# Patient Record
Sex: FEMALE | Race: WHITE | NOT HISPANIC OR LATINO | URBAN - METROPOLITAN AREA
[De-identification: names, ages, dates, MRNs, and addresses within clinical notes are randomized per-mention and may not be internally consistent; named-entity substitution may affect disease eponyms.]

---

## 2022-11-02 ENCOUNTER — EMERGENCY (EMERGENCY)
Facility: HOSPITAL | Age: 82
LOS: 1 days | Discharge: HOME | End: 2022-11-02
Attending: EMERGENCY MEDICINE
Payer: MEDICARE

## 2022-11-02 VITALS
TEMPERATURE: 98 F | HEIGHT: 62.6 IN | OXYGEN SATURATION: 99 % | DIASTOLIC BLOOD PRESSURE: 78 MMHG | HEART RATE: 90 BPM | RESPIRATION RATE: 19 BRPM | WEIGHT: 136.69 LBS | SYSTOLIC BLOOD PRESSURE: 160 MMHG

## 2022-11-02 PROCEDURE — 99285 EMERGENCY DEPT VISIT HI MDM: CPT

## 2022-11-02 RX ORDER — MORPHINE SULFATE 50 MG/1
4 CAPSULE, EXTENDED RELEASE ORAL ONCE
Refills: 0 | Status: DISCONTINUED | OUTPATIENT
Start: 2022-11-02 | End: 2022-11-02

## 2022-11-03 VITALS
HEART RATE: 87 BPM | RESPIRATION RATE: 18 BRPM | OXYGEN SATURATION: 97 % | SYSTOLIC BLOOD PRESSURE: 116 MMHG | TEMPERATURE: 98 F | DIASTOLIC BLOOD PRESSURE: 69 MMHG

## 2022-11-03 LAB
ALBUMIN SERPL ELPH-MCNC: 3 G/DL — LOW (ref 3.5–5)
ALP SERPL-CCNC: 52 U/L — SIGNIFICANT CHANGE UP (ref 40–120)
ALT FLD-CCNC: 19 U/L DA — SIGNIFICANT CHANGE UP (ref 10–60)
ANION GAP SERPL CALC-SCNC: 9 MMOL/L — SIGNIFICANT CHANGE UP (ref 5–17)
APPEARANCE UR: CLEAR — SIGNIFICANT CHANGE UP
AST SERPL-CCNC: 13 U/L — SIGNIFICANT CHANGE UP (ref 10–40)
BASOPHILS # BLD AUTO: 0.06 K/UL — SIGNIFICANT CHANGE UP (ref 0–0.2)
BASOPHILS NFR BLD AUTO: 0.8 % — SIGNIFICANT CHANGE UP (ref 0–2)
BILIRUB SERPL-MCNC: 0.5 MG/DL — SIGNIFICANT CHANGE UP (ref 0.2–1.2)
BILIRUB UR-MCNC: NEGATIVE — SIGNIFICANT CHANGE UP
BUN SERPL-MCNC: 28 MG/DL — HIGH (ref 7–18)
CALCIUM SERPL-MCNC: 8.9 MG/DL — SIGNIFICANT CHANGE UP (ref 8.4–10.5)
CHLORIDE SERPL-SCNC: 108 MMOL/L — SIGNIFICANT CHANGE UP (ref 96–108)
CO2 SERPL-SCNC: 22 MMOL/L — SIGNIFICANT CHANGE UP (ref 22–31)
COLOR SPEC: YELLOW — SIGNIFICANT CHANGE UP
CREAT SERPL-MCNC: 1.53 MG/DL — HIGH (ref 0.5–1.3)
DIFF PNL FLD: ABNORMAL
EGFR: 34 ML/MIN/1.73M2 — LOW
EOSINOPHIL # BLD AUTO: 0.11 K/UL — SIGNIFICANT CHANGE UP (ref 0–0.5)
EOSINOPHIL NFR BLD AUTO: 1.5 % — SIGNIFICANT CHANGE UP (ref 0–6)
GLUCOSE SERPL-MCNC: 123 MG/DL — HIGH (ref 70–99)
GLUCOSE UR QL: NEGATIVE — SIGNIFICANT CHANGE UP
HCT VFR BLD CALC: 31.5 % — LOW (ref 34.5–45)
HGB BLD-MCNC: 9.6 G/DL — LOW (ref 11.5–15.5)
IMM GRANULOCYTES NFR BLD AUTO: 0.3 % — SIGNIFICANT CHANGE UP (ref 0–0.9)
KETONES UR-MCNC: NEGATIVE — SIGNIFICANT CHANGE UP
LEUKOCYTE ESTERASE UR-ACNC: NEGATIVE — SIGNIFICANT CHANGE UP
LIDOCAIN IGE QN: 88 U/L — SIGNIFICANT CHANGE UP (ref 73–393)
LYMPHOCYTES # BLD AUTO: 1.43 K/UL — SIGNIFICANT CHANGE UP (ref 1–3.3)
LYMPHOCYTES # BLD AUTO: 18.9 % — SIGNIFICANT CHANGE UP (ref 13–44)
MCHC RBC-ENTMCNC: 27.8 PG — SIGNIFICANT CHANGE UP (ref 27–34)
MCHC RBC-ENTMCNC: 30.5 GM/DL — LOW (ref 32–36)
MCV RBC AUTO: 91.3 FL — SIGNIFICANT CHANGE UP (ref 80–100)
MONOCYTES # BLD AUTO: 0.89 K/UL — SIGNIFICANT CHANGE UP (ref 0–0.9)
MONOCYTES NFR BLD AUTO: 11.8 % — SIGNIFICANT CHANGE UP (ref 2–14)
NEUTROPHILS # BLD AUTO: 5.05 K/UL — SIGNIFICANT CHANGE UP (ref 1.8–7.4)
NEUTROPHILS NFR BLD AUTO: 66.7 % — SIGNIFICANT CHANGE UP (ref 43–77)
NITRITE UR-MCNC: NEGATIVE — SIGNIFICANT CHANGE UP
NRBC # BLD: 0 /100 WBCS — SIGNIFICANT CHANGE UP (ref 0–0)
PH UR: 5 — SIGNIFICANT CHANGE UP (ref 5–8)
PLATELET # BLD AUTO: 323 K/UL — SIGNIFICANT CHANGE UP (ref 150–400)
POTASSIUM SERPL-MCNC: 4 MMOL/L — SIGNIFICANT CHANGE UP (ref 3.5–5.3)
POTASSIUM SERPL-SCNC: 4 MMOL/L — SIGNIFICANT CHANGE UP (ref 3.5–5.3)
PROT SERPL-MCNC: 7.4 G/DL — SIGNIFICANT CHANGE UP (ref 6–8.3)
PROT UR-MCNC: 15
RBC # BLD: 3.45 M/UL — LOW (ref 3.8–5.2)
RBC # FLD: 14 % — SIGNIFICANT CHANGE UP (ref 10.3–14.5)
SODIUM SERPL-SCNC: 139 MMOL/L — SIGNIFICANT CHANGE UP (ref 135–145)
SP GR SPEC: 1 — LOW (ref 1.01–1.02)
UROBILINOGEN FLD QL: NEGATIVE — SIGNIFICANT CHANGE UP
WBC # BLD: 7.56 K/UL — SIGNIFICANT CHANGE UP (ref 3.8–10.5)
WBC # FLD AUTO: 7.56 K/UL — SIGNIFICANT CHANGE UP (ref 3.8–10.5)

## 2022-11-03 PROCEDURE — 74176 CT ABD & PELVIS W/O CONTRAST: CPT | Mod: MA

## 2022-11-03 PROCEDURE — 36415 COLL VENOUS BLD VENIPUNCTURE: CPT

## 2022-11-03 PROCEDURE — 74176 CT ABD & PELVIS W/O CONTRAST: CPT | Mod: 26,MA

## 2022-11-03 PROCEDURE — 80053 COMPREHEN METABOLIC PANEL: CPT

## 2022-11-03 PROCEDURE — 83690 ASSAY OF LIPASE: CPT

## 2022-11-03 PROCEDURE — 85025 COMPLETE CBC W/AUTO DIFF WBC: CPT

## 2022-11-03 PROCEDURE — 96374 THER/PROPH/DIAG INJ IV PUSH: CPT

## 2022-11-03 PROCEDURE — 87086 URINE CULTURE/COLONY COUNT: CPT

## 2022-11-03 PROCEDURE — 99284 EMERGENCY DEPT VISIT MOD MDM: CPT | Mod: 25

## 2022-11-03 PROCEDURE — 81001 URINALYSIS AUTO W/SCOPE: CPT

## 2022-11-03 RX ORDER — OXYCODONE AND ACETAMINOPHEN 5; 325 MG/1; MG/1
1 TABLET ORAL
Qty: 12 | Refills: 0
Start: 2022-11-03 | End: 2022-11-05

## 2022-11-03 RX ORDER — TAMSULOSIN HYDROCHLORIDE 0.4 MG/1
1 CAPSULE ORAL
Qty: 7 | Refills: 0
Start: 2022-11-03 | End: 2022-11-09

## 2022-11-03 RX ORDER — IBUPROFEN 200 MG
1 TABLET ORAL
Qty: 28 | Refills: 0
Start: 2022-11-03 | End: 2022-11-09

## 2022-11-03 RX ADMIN — MORPHINE SULFATE 4 MILLIGRAM(S): 50 CAPSULE, EXTENDED RELEASE ORAL at 00:07

## 2022-11-03 NOTE — ED PROVIDER NOTE - PATIENT PORTAL LINK FT
You can access the FollowMyHealth Patient Portal offered by [facility] by registering at the following website: [email or web address]. By joining [employer]’s FollowMyHealth portal, you will also be able to view your health information using other applications (apps) compatible with our system.

## 2022-11-03 NOTE — ED PROVIDER NOTE - NSFOLLOWUPINSTRUCTIONS_ED_ALL_ED_FT
[facility]                                                                                                                    Kidney Stones       Kidney stones are solid, rock-like deposits that form inside of the kidneys. The kidneys are a pair of organs that make urine. A kidney stone may form in a kidney and move into other parts of the urinary tract, including the tubes that connect the kidneys to the bladder (ureters), the bladder, and the tube that carries urine out of the body (urethra). As the stone moves through these areas, it can cause intense pain and block the flow of urine.    Kidney stones are created when high levels of certain minerals are found in the urine. The stones are usually passed out of the body through urination, but in some cases, medical treatment may be needed to remove them.      What are the causes?    Kidney stones may be caused by:  •A condition in which certain glands produce too much parathyroid hormone (primary hyperparathyroidism), which causes too much calcium buildup in the blood.      •A buildup of uric acid crystals in the bladder (hyperuricosuria). Uric acid is a chemical that the body produces when you eat certain foods. It usually exits the body in the urine.      •Narrowing (stricture) of one or both of the ureters.      •A kidney blockage that is present at birth (congenital obstruction).      •Past surgery on the kidney or the ureters, such as gastric bypass surgery.        What increases the risk?    The following factors may make you more likely to develop this condition:  •Having had a kidney stone in the past.      •Having a family history of kidney stones.      •Not drinking enough water.      •Eating a diet that is high in protein, salt (sodium), or sugar.      •Being overweight or obese.        What are the signs or symptoms?    Symptoms of a kidney stone may include:  •Pain in the side of the abdomen, right below the ribs (flank pain). Pain usually spreads (radiates) to the groin.      •Needing to urinate frequently or urgently.      •Painful urination.      •Blood in the urine (hematuria).      •Nausea.      •Vomiting.      •Fever and chills.        How is this diagnosed?    This condition may be diagnosed based on:  •Your symptoms and medical history.      •A physical exam.      •Blood tests.      •Urine tests. These may be done before and after the stone passes out of your body through urination.      •Imaging tests, such as a CT scan, abdominal X-ray, or ultrasound.      •A procedure to examine the inside of the bladder (cystoscopy).        How is this treated?    Treatment for kidney stones depends on the size, location, and makeup of the stones. Kidney stones will often pass out of the body through urination. You may need to:  •Increase your fluid intake to help pass the stone. In some cases, you may be given fluids through an IV and may need to be monitored at the hospital.      •Take medicine for pain.      •Make changes in your diet to help prevent kidney stones from coming back.      Sometimes, medical procedures are needed to remove a kidney stone. This may involve:•A procedure to break up kidney stones using:  •A focused beam of light (laser therapy).      •Shock waves (extracorporeal shock wave lithotripsy).        •Surgery to remove kidney stones. This may be needed if you have severe pain or have stones that block your urinary tract.        Follow these instructions at home:    Medicines     •Take over-the-counter and prescription medicines only as told by your health care provider.      •Ask your health care provider if the medicine prescribed to you requires you to avoid driving or using heavy machinery.      Eating and drinking     •Drink enough fluid to keep your urine pale yellow. You may be instructed to drink at least 8–10 glasses of water each day. This will help you pass the kidney stone.    •If directed, change your diet. This may include:  •Limiting how much sodium you eat.      •Eating more fruits and vegetables.      •Limiting how much animal protein—such as red meat, poultry, fish, and eggs—you eat.        •Follow instructions from your health care provider about eating or drinking restrictions.      General instructions   •Collect urine samples as told by your health care provider. You may need to collect a urine sample:  •24 hours after you pass the stone.      •8–12 weeks after passing the kidney stone, and every 6–12 months after that.        •Strain your urine every time you urinate, for as long as directed. Use the strainer that your health care provider recommends.      • Do not throw out the kidney stone after passing it. Keep the stone so it can be tested by your health care provider. Testing the makeup of your kidney stone may help prevent you from getting kidney stones in the future.      •Keep all follow-up visits as told by your health care provider. This is important. You may need follow-up X-rays or ultrasounds to make sure that your stone has passed.        How is this prevented?     To prevent another kidney stone:  •Drink enough fluid to keep your urine pale yellow. This is the best way to prevent kidney stones.      •Eat a healthy diet and follow recommendations from your health care provider about foods to avoid. You may be instructed to eat a low-protein diet. Recommendations vary depending on the type of kidney stone that you have.      •Maintain a healthy weight.        Where to find more information    •National Kidney Foundation (NKF): www.kidney.org      •[employer] (UCF): www.urologyhealth.org        Contact a health care provider if:    •You have pain that gets worse or does not get better with medicine.        Get help right away if:    •You have a fever or chills.      •You develop severe pain.      •You develop new abdominal pain.      •You faint.      •You are unable to urinate.        Summary    •Kidney stones are solid, rock-like deposits that form inside of the kidneys.      •Kidney stones can cause nausea, vomiting, blood in the urine, abdominal pain, and the urge to urinate frequently.      •Treatment for kidney stones depends on the size, location, and makeup of the stones. Kidney stones will often pass out of the body through urination.      •Kidney stones can be prevented by drinking enough fluids, eating a healthy diet, and maintaining a healthy weight.      This information is not intended to replace advice given to you by your health care provider. Make sure you discuss any questions you have with your health care provider.      Document Revised: 05/01/2020 Document Reviewed: 05/05/2020    [employer] Patient Education © 2022 [employer]

## 2022-11-03 NOTE — ED PROVIDER NOTE - CARE PLAN
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 Nando Green is supervising clinician today.    ECO - No physically strenuous activity, but ambulatory and able to carry out light or sedentary work.    Review and verified Advanced Directives: Yes; verified forms in EMR    Verified if patient has state DNR bracelet on: No; Full Code    Nursing Assessment:   A focused nursing assessment addressing the toxicity of chemotherapy was performed and the patient reports the following:  Nausea: NO  Vomiting: NO  Fever: NO  Chills: NO  Other signs of infection: NO  Bleeding: NO  Mucositis: NO  Diarrhea: NO  Constipation: NO  Anorexia: NO  Dysuria: NO  Urinary Bleeding: NO  Cough: NO  Shortness of Breath: NO  Fatigue/Weakness: YES  Numbness/Tingling: NO  Other Neuropathies: NO  Edema: NO  Rash: NO  Hand/Foot Syndrome: NO  Anxiety/Depression/Insomnia: YES, insomnia  Pain: NO       Pre-Treatment: - Treatment consent signed  - Patient has valid pre-authorization  - VS completed  - Height and weight verified  BSA independently double checked & verified by two practitioners  - Premed orders, including hydration, are verified prior to administration  - Chemotherapy doses independently doubled checked & verified by two practitioners  - Chemotherapy infusion pump settings are double checked & verified by two practitioners  - Patient is identified by first & last name, Date of birth that has been verified by two practitioners with the patient chairside.    Treatment: Refer to LDA and MAR for line assessment and medication administration  Refer to Toxicity Assessment doc flowsheet   Chemotherapy has not ; double checked & verified by two practitioners  Appearance and physical integrity of drugs meets standard of drug monograph; double checked & verified by two practitioners  Rate set on infusion pump is in alignment with ordered rate; double checked & verified  by two practitioners  Blood return confirmed before, during and after treatment administered  Infusion pump used for non-vesicant drugs    Post Treatment: Treatment tolerated well; no adverse reaction    Does the Patient have a central line?  no    Transfusion: Not needed    Integrative Medicine: No    Oral Chemotherapy: No    Education: Instructed on chemotherapy medications, home medication of decadron, side effects of chemo.  Clarified with patient how to take PO dexamethasone at home.  Patient took 3 days prior to treatment, Dr. Green made aware, no new orders.  Patient was instructed to decrease ibuprofen usage while on alimta, Dr. Green aware of patient taking ibuprofen and stated  will continue to monitor kidney functions.  Next appointment scheduled:   Future Appointments   Date Time Provider Department [place]   3/17/2020  8:30 AM [id number]   3/17/2020  8:45 AM Nando Green MD [address]   4/1/2020  9:15 AM [id number] ACL LAB ACL[facility]   4/1/2020  9:30 AM Nando Grene MD [address]   8/28/2020  8:30 AM [facility]     Patient instructed to call the office with any questions or concerns.    Patient Discharged: patient discharged to home per self, ambulatory     Principal Discharge DX:	Kidney stone   1

## 2022-11-03 NOTE — ED ADULT NURSE NOTE - OBJECTIVE STATEMENT
RN  Covering notes: pt came to ED due to left lower abdominal pain x 2-3 days had slipped and fall  few days ago at home denies headinjury.

## 2022-11-03 NOTE — ED PROVIDER NOTE - PROGRESS NOTE DETAILS
pain improved. labs are unremarkable .ua with small blood. ct abdo/pelvis with large left uretal stone. will dc. pt already has a urologist. will dc. return precautions discussed.

## 2022-11-03 NOTE — ED PROVIDER NOTE - CLINICAL SUMMARY MEDICAL DECISION MAKING FREE TEXT BOX
82 year old female with left sided abd pain. PE as above.  labs, UA, pain control, ct abdo/pelvis, reassess

## 2022-11-03 NOTE — ED PROVIDER NOTE - OBJECTIVE STATEMENT
82 year old female PMH DM, HTN, anemia, kidney stones coming in with left sided abdominal pain for the past 2 days. did have a minor fall 2 days ago and unsure if that could have caused the pain. denies all other complaints.

## 2022-11-04 LAB
CULTURE RESULTS: SIGNIFICANT CHANGE UP
SPECIMEN SOURCE: SIGNIFICANT CHANGE UP

## 2023-01-11 ENCOUNTER — APPOINTMENT (OUTPATIENT)
Dept: UROLOGY | Facility: CLINIC | Age: 83
End: 2023-01-11
Payer: MEDICARE

## 2023-01-11 VITALS
SYSTOLIC BLOOD PRESSURE: 111 MMHG | RESPIRATION RATE: 17 BRPM | HEIGHT: 59 IN | HEART RATE: 80 BPM | TEMPERATURE: 97.6 F | BODY MASS INDEX: 26.21 KG/M2 | DIASTOLIC BLOOD PRESSURE: 66 MMHG | WEIGHT: 130 LBS

## 2023-01-11 DIAGNOSIS — N20.1 CALCULUS OF URETER: ICD-10-CM

## 2023-01-11 DIAGNOSIS — N20.0 CALCULUS OF KIDNEY: ICD-10-CM

## 2023-01-11 PROCEDURE — 99205 OFFICE O/P NEW HI 60 MIN: CPT

## 2023-01-11 NOTE — PHYSICAL EXAM
[General Appearance - Well Developed] : well developed [General Appearance - Well Nourished] : well nourished [Normal Appearance] : normal appearance [Well Groomed] : well groomed [General Appearance - In No Acute Distress] : no acute distress [] : no respiratory distress [Respiration, Rhythm And Depth] : normal respiratory rhythm and effort [Exaggerated Use Of Accessory Muscles For Inspiration] : no accessory muscle use [Abdomen Soft] : soft [Abdomen Tenderness] : non-tender [Costovertebral Angle Tenderness] : no ~M costovertebral angle tenderness [Normal Station and Gait] : the gait and station were normal for the patient's age [Skin Color & Pigmentation] : normal skin color and pigmentation [Oriented To Time, Place, And Person] : oriented to person, place, and time [Not Anxious] : not anxious

## 2023-01-11 NOTE — ASSESSMENT
[Ureteral Stone (592.1\N20.1)] : position [FreeTextEntry1] : Will need left PCNL left ureteroscopy laser lithotripsy\par At a later date, we will proceed to the right side if not possible to do both sides during the same hospitalization\par Potential complications of bleeding, transfusion, selective angioembolization if indicated, adjacent organ injury, fever, sepsis, infection, incomplete stone clearance, bowel or other unusual injury, chest complications, vascular injuries, procedures needed to address any complications, ureteral injury, anesthetic complications, all discussed.\par Counseled patient and son to expect the patient to need transfusion given her baseline anemia

## 2023-01-11 NOTE — HISTORY OF PRESENT ILLNESS
[FreeTextEntry1] : 82-year-old woman\par Accompanied by her son who acted as  throughout\par Patient presents for evaluation\par She was recently at [facility] with left-sided renal colic on November 2, 2022.  She was diagnosed with a large obstructing left mid to proximal ureteral stone.  Her pain was controlled and she was discharged home with instructions for outpatient consultation.  After period of about 5 days, her pain resolved.  She has remained pain-free since then.\par \par She is currently under treatment for anemia as well under the care of a hematologist.  She is currently receiving iron infusions intravenously\par \par Hospital records reviewed, chart reviewed, hematology notes reviewed, labs reviewed, CT images reviewed\par Large bilateral renal stones involving lower calyces renal pelvis and mid calyces, confirmed left mid to proximal ureteral stone, right-sided hydroureter possible right mid ureteral stones.\par \par Creatinine 0.87-1.53\par Urine cultures negative x2\par \par PMH/PSH: Type II DM, HTN, anemia, kidney stones, osteoarthritis, Hyperlipidemia, cataract surgery\par \par Meds: Iron infusion,  Arthrotec 75 75 mg­200 mcg tablet, Fred 5 mg­20 mg tablet, Janumet 50 mg­500 mg tablet, Vitamin D2 1250 mcg ([id number] unit) capsule\par \par Allergies: NKDA

## 2023-01-11 NOTE — HISTORY OF PRESENT ILLNESS
[FreeTextEntry1] : 82-year-old woman\par Accompanied by her son who acted as  throughout\par Patient presents for evaluation\par She was recently at [facility] with left-sided renal colic on November 2, 2022.  She was diagnosed with a large obstructing left mid to proximal ureteral stone.  Her pain was controlled and she was discharged home with instructions for outpatient consultation.  After period of about 5 days, her pain resolved.  She has remained pain-free since then.\par \par She is currently under treatment for anemia as well under the care of a hematologist.  She is currently receiving iron infusions intravenously\par \par Hospital records reviewed, chart reviewed, hematology notes reviewed, labs reviewed, CT images reviewed\par Large bilateral renal stones involving lower calyces renal pelvis and mid calyces, confirmed left mid to proximal ureteral stone, right-sided hydroureter possible right mid ureteral stones.\par \par Creatinine 0.87-1.53\par Urine cultures negative x2\par \par PMH/PSH: Type II DM, HTN, anemia, kidney stones, osteoarthritis, Hyperlipidemia, cataract surgery\par \par Meds: Iron infusion,  Arthrotec 75 75 mg­200 mcg tablet, Fred 5 mg­20 mg tablet, Janumet 50 mg­500 mg tablet, Vitamin D2 1250 mcg ([id number] unit) capsule\par \par Allergies: NKDA

## 2023-02-21 ENCOUNTER — APPOINTMENT (OUTPATIENT)
Dept: UROLOGY | Facility: HOSPITAL | Age: 83
End: 2023-02-21

## 2024-03-22 ENCOUNTER — NON-APPOINTMENT (OUTPATIENT)
Age: 84
End: 2024-03-22

## 2024-03-22 ENCOUNTER — APPOINTMENT (OUTPATIENT)
Dept: RADIATION ONCOLOGY | Facility: CLINIC | Age: 84
End: 2024-03-22
Payer: MEDICARE

## 2024-03-22 VITALS — WEIGHT: 138.89 LBS | BODY MASS INDEX: 24.61 KG/M2 | HEIGHT: 63 IN | RESPIRATION RATE: 16 BRPM

## 2024-03-22 DIAGNOSIS — I10 ESSENTIAL (PRIMARY) HYPERTENSION: ICD-10-CM

## 2024-03-22 DIAGNOSIS — E78.5 HYPERLIPIDEMIA, UNSPECIFIED: ICD-10-CM

## 2024-03-22 DIAGNOSIS — Z78.9 OTHER SPECIFIED HEALTH STATUS: ICD-10-CM

## 2024-03-22 DIAGNOSIS — E11.9 TYPE 2 DIABETES MELLITUS W/OUT COMPLICATIONS: ICD-10-CM

## 2024-03-22 PROCEDURE — 99204 OFFICE O/P NEW MOD 45 MIN: CPT

## 2024-03-22 NOTE — VITALS
[Maximal Pain Intensity: 3/10] : 3/10 [Least Pain Intensity: 0/10] : 0/10 [Pain Description/Quality: ___] : Pain description/quality: [unfilled] [Pain Location: ___] : Pain Location: [unfilled] [Pain Duration: ___] : Pain duration: [unfilled] [80: Normal activity with effort; some signs or symptoms of disease.] : 80: Normal activity with effort; some signs or symptoms of disease.  [OTC] : OTC [ECOG Performance Status: 1 - Restricted in physically strenuous activity but ambulatory and able to carry out work of a light or sedentary nature] : Performance Status: 1 - Restricted in physically strenuous activity but ambulatory and able to carry out work of a light or sedentary nature, e.g., light house work, office work [5 - Distress Level] : Distress Level: 5

## 2024-03-24 NOTE — REASON FOR VISIT
[Consideration of Curative Therapy] : consideration of curative therapy for [Other: ___] : [unfilled] [Family Member] : family member [Other: ______] : provided by [employer] [Interpreters_IDNumber] : [id number] [Interpreters_FullName] : Max [TWNoteComboBox1] : Bangladeshi

## 2024-03-24 NOTE — HISTORY OF PRESENT ILLNESS
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 It is ineligible for topical panretin

## 2024-03-24 NOTE — DISEASE MANAGEMENT
[Clinical] : TNM Stage: c [I] : I [TTNM] : 0 [FreeTextEntry4] : kaposi sarcoma [NTNM] : x [MTNM] : x

## 2024-03-24 NOTE — PHYSICAL EXAM
[Normal] : oriented to person, place and time, the affect was normal, the mood was normal and not anxious [de-identified] : 3cm peduculated lesion left sole

## 2024-03-24 NOTE — REVIEW OF SYSTEMS
[Negative] : Allergic/Immunologic [FreeTextEntry5] : HTN, HLD [de-identified] : DM2 [de-identified] : left sole skin lesion

## 2024-03-24 NOTE — END OF VISIT
[FreeTextEntry3] : Agree with above. Resection given tumor size followed by RT. Discussed with Dr Hope who will consider surgery.  [] : Resident [Time Spent: ___ minutes] : I have spent [unfilled] minutes of time on the encounter.

## 2024-03-26 ENCOUNTER — APPOINTMENT (OUTPATIENT)
Dept: SURGICAL ONCOLOGY | Facility: CLINIC | Age: 84
End: 2024-03-26
Payer: MEDICARE

## 2024-03-26 VITALS
HEART RATE: 77 BPM | OXYGEN SATURATION: 97 % | HEIGHT: 62 IN | BODY MASS INDEX: 25.4 KG/M2 | RESPIRATION RATE: 16 BRPM | DIASTOLIC BLOOD PRESSURE: 73 MMHG | SYSTOLIC BLOOD PRESSURE: 124 MMHG | WEIGHT: 138 LBS

## 2024-03-26 PROCEDURE — 99205 OFFICE O/P NEW HI 60 MIN: CPT

## 2024-03-29 ENCOUNTER — APPOINTMENT (OUTPATIENT)
Dept: RADIATION ONCOLOGY | Facility: CLINIC | Age: 84
End: 2024-03-29
Payer: MEDICARE

## 2024-03-29 PROCEDURE — 99442: CPT | Mod: 93

## 2024-03-29 NOTE — VITALS
[Maximal Pain Intensity: 3/10] : 3/10 [Pain Description/Quality: ___] : Pain description/quality: [unfilled] [Least Pain Intensity: 0/10] : 0/10 [Pain Duration: ___] : Pain duration: [unfilled] [OTC] : OTC [Pain Location: ___] : Pain Location: [unfilled] [80: Normal activity with effort; some signs or symptoms of disease.] : 80: Normal activity with effort; some signs or symptoms of disease.  [ECOG Performance Status: 1 - Restricted in physically strenuous activity but ambulatory and able to carry out work of a light or sedentary nature] : Performance Status: 1 - Restricted in physically strenuous activity but ambulatory and able to carry out work of a light or sedentary nature, e.g., light house work, office work [5 - Distress Level] : Distress Level: 5

## 2024-03-29 NOTE — REASON FOR VISIT
[Other: ___] : [unfilled] [Family Member] : family member [Medical Office: ([facility])___] : at the medical office located in  [Home] : at home, [unfilled] , at the time of the visit. [Formal Caregiver] : formal caregiver [Verbal consent obtained from patient] : the patient, [unfilled] [Routine Follow-Up] : routine follow-up visit for

## 2024-04-01 NOTE — HISTORY OF PRESENT ILLNESS
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 It is ineligible for topical panretin.  3/29/2024 F/U. Pt saw Surgeon Dr Hope, surgery for left foot Kaposi sarcoma was scheduled for 4/18/2024.

## 2024-04-01 NOTE — DISEASE MANAGEMENT
[Clinical] : TNM Stage: c [I] : I [FreeTextEntry4] : kaposi sarcoma [TTNM] : 0 [MTNM] : x [NTNM] : x

## 2024-04-01 NOTE — REVIEW OF SYSTEMS
[Negative] : Allergic/Immunologic [FreeTextEntry5] : HTN, HLD [de-identified] : left sole skin lesion [de-identified] : DM2

## 2024-04-04 ENCOUNTER — APPOINTMENT (OUTPATIENT)
Dept: ULTRASOUND IMAGING | Facility: CLINIC | Age: 84
End: 2024-04-04
Payer: MEDICARE

## 2024-04-04 ENCOUNTER — APPOINTMENT (OUTPATIENT)
Dept: RADIOLOGY | Facility: CLINIC | Age: 84
End: 2024-04-04
Payer: MEDICARE

## 2024-04-04 PROCEDURE — 71046 X-RAY EXAM CHEST 2 VIEWS: CPT

## 2024-04-04 PROCEDURE — 76882 US LMTD JT/FCL EVL NVASC XTR: CPT | Mod: LT

## 2024-04-12 ENCOUNTER — OUTPATIENT (OUTPATIENT)
Dept: OUTPATIENT SERVICES | Facility: HOSPITAL | Age: 84
LOS: 1 days | End: 2024-04-12
Payer: MEDICARE

## 2024-04-12 ENCOUNTER — OUTPATIENT (OUTPATIENT)
Dept: OUTPATIENT SERVICES | Facility: HOSPITAL | Age: 84
LOS: 1 days | End: 2024-04-12

## 2024-04-12 VITALS
WEIGHT: 134.92 LBS | HEIGHT: 61.5 IN | HEART RATE: 87 BPM | OXYGEN SATURATION: 98 % | RESPIRATION RATE: 16 BRPM | SYSTOLIC BLOOD PRESSURE: 125 MMHG | TEMPERATURE: 98 F | DIASTOLIC BLOOD PRESSURE: 71 MMHG

## 2024-04-12 DIAGNOSIS — I10 ESSENTIAL (PRIMARY) HYPERTENSION: ICD-10-CM

## 2024-04-12 DIAGNOSIS — Z98.49 CATARACT EXTRACTION STATUS, UNSPECIFIED EYE: Chronic | ICD-10-CM

## 2024-04-12 DIAGNOSIS — C46.9 KAPOSI'S SARCOMA, UNSPECIFIED: ICD-10-CM

## 2024-04-12 DIAGNOSIS — C80.1 MALIGNANT (PRIMARY) NEOPLASM, UNSPECIFIED: ICD-10-CM

## 2024-04-12 DIAGNOSIS — Z85.9 PERSONAL HISTORY OF MALIGNANT NEOPLASM, UNSPECIFIED: ICD-10-CM

## 2024-04-12 RX ORDER — SODIUM CHLORIDE 9 MG/ML
1000 INJECTION, SOLUTION INTRAVENOUS
Refills: 0 | Status: DISCONTINUED | OUTPATIENT
Start: 2024-04-18 | End: 2024-05-02

## 2024-04-12 NOTE — H&P PST ADULT - ATTENDING COMMENTS
83-year-old lady with Kaposi's sarcoma of the bottom of her left foot.    She is scheduled for resection, with plastics closure by Dr. Artemio Rdz.    Her diagnosis and plan were reviewed with her and her daughter in my office, and again today.    oncologic diagnosis, surgical approach, risks, benefits and possible surgical outcomes reviewed in detail, all questions answered.    Consent on chart

## 2024-04-12 NOTE — H&P PST ADULT - HISTORY OF PRESENT ILLNESS
Pt is a 83 yr old female scheduled for Wide Excision Left Foot Sarcoma with Dr Hope and Closure Left Foot Wound Poss Local Tissue Rearrangement Poss skin Graft with wound vac with Dr Rdz  4/18/24 - pt speaks [place] and is with grand daughter who translated - pt noted raised , scaled growth mid-sole left foot 1/24 - biopsy done - pt denies pain but states it is uncomfortable to walk - pt with hx HTN, arthritis , HLD

## 2024-04-12 NOTE — H&P PST ADULT - NSANTHOSAYNRD_GEN_A_CORE
No. TIANA screening performed.  STOP BANG Legend: 0-2 = LOW Risk; 3-4 = INTERMEDIATE Risk; 5-8 = HIGH Risk

## 2024-04-12 NOTE — H&P PST ADULT - PROBLEM SELECTOR PLAN 1
Pt scheduled for surgery and preop instructions including instructions for taking Famotidine and for Chlorhexidine use in showering on the day of surgery, given verbally and with use of  written materials, and patient confirming understanding of such instructions using  teach back method.  Request cardio evaluation from PCP office with EKG , Echo and stress test results

## 2024-04-15 ENCOUNTER — RESULT REVIEW (OUTPATIENT)
Age: 84
End: 2024-04-15

## 2024-04-15 PROCEDURE — 88321 CONSLTJ&REPRT SLD PREP ELSWR: CPT

## 2024-04-16 LAB — SURGICAL PATHOLOGY STUDY: SIGNIFICANT CHANGE UP

## 2024-04-17 NOTE — ASU PATIENT PROFILE, ADULT - NSICDXPASTMEDICALHX_GEN_ALL_CORE_FT
PAST MEDICAL HISTORY:  Arthritis     H/O Kaposi's sarcoma of skin     HLD (hyperlipidemia)     HTN (hypertension)     Kidney stone

## 2024-04-17 NOTE — ASU PATIENT PROFILE, ADULT - FALL HARM RISK - HARM RISK INTERVENTIONS
Assistance with ambulation/Assistance OOB with selected safe patient handling equipment/Communicate Risk of Fall with Harm to all staff/Discuss with provider need for PT consult/Monitor gait and stability/Reinforce activity limits and safety measures with patient and family/Tailored Fall Risk Interventions/Visual Cue: Yellow wristband and red socks/Bed in lowest position, wheels locked, appropriate side rails in place/Call bell, personal items and telephone in reach/Instruct patient to call for assistance before getting out of bed or chair/Non-slip footwear when patient is out of bed/[place] to call system/Physically safe environment - no spills, clutter or unnecessary equipment/Purposeful Proactive Rounding/Room/bathroom lighting operational, light cord in reach

## 2024-04-18 ENCOUNTER — APPOINTMENT (OUTPATIENT)
Dept: SURGICAL ONCOLOGY | Facility: HOSPITAL | Age: 84
End: 2024-04-18

## 2024-04-18 ENCOUNTER — APPOINTMENT (OUTPATIENT)
Dept: PLASTIC SURGERY | Facility: HOSPITAL | Age: 84
End: 2024-04-18
Payer: MEDICARE

## 2024-04-18 ENCOUNTER — TRANSCRIPTION ENCOUNTER (OUTPATIENT)
Age: 84
End: 2024-04-18

## 2024-04-18 ENCOUNTER — RESULT REVIEW (OUTPATIENT)
Age: 84
End: 2024-04-18

## 2024-04-18 ENCOUNTER — OUTPATIENT (OUTPATIENT)
Dept: OUTPATIENT SERVICES | Facility: HOSPITAL | Age: 84
LOS: 1 days | Discharge: HOME | End: 2024-04-18
Payer: MEDICARE

## 2024-04-18 VITALS
HEART RATE: 85 BPM | RESPIRATION RATE: 16 BRPM | DIASTOLIC BLOOD PRESSURE: 66 MMHG | SYSTOLIC BLOOD PRESSURE: 106 MMHG | OXYGEN SATURATION: 96 %

## 2024-04-18 VITALS
DIASTOLIC BLOOD PRESSURE: 53 MMHG | HEART RATE: 80 BPM | OXYGEN SATURATION: 99 % | WEIGHT: 134.92 LBS | TEMPERATURE: 99 F | SYSTOLIC BLOOD PRESSURE: 139 MMHG | RESPIRATION RATE: 16 BRPM | HEIGHT: 61.5 IN

## 2024-04-18 DIAGNOSIS — Z98.49 CATARACT EXTRACTION STATUS, UNSPECIFIED EYE: Chronic | ICD-10-CM

## 2024-04-18 DIAGNOSIS — C46.9 KAPOSI'S SARCOMA, UNSPECIFIED: ICD-10-CM

## 2024-04-18 PROCEDURE — 15240 FTH/GFT F/C/C/M/N/AX/G/H/F20: CPT

## 2024-04-18 PROCEDURE — 15004 WOUND PREP F/N/HF/G: CPT

## 2024-04-18 PROCEDURE — 88305 TISSUE EXAM BY PATHOLOGIST: CPT | Mod: 26

## 2024-04-18 PROCEDURE — 29515 APPLICATION SHORT LEG SPLINT: CPT | Mod: LT,59

## 2024-04-18 PROCEDURE — 11624 EXC S/N/H/F/G MAL+MRG 3.1-4: CPT

## 2024-04-18 RX ORDER — ROSUVASTATIN CALCIUM 5 MG/1
1 TABLET ORAL
Refills: 0 | DISCHARGE

## 2024-04-18 RX ORDER — DICLOFENAC SODIUM/MISOPROSTOL 50 MG-200
1 TABLET,IMMEDIATE,DELAY RELEASE,BIPHASE ORAL
Refills: 0 | DISCHARGE

## 2024-04-18 RX ORDER — ASPIRIN/CALCIUM CARB/MAGNESIUM 324 MG
0 TABLET ORAL
Refills: 0 | DISCHARGE

## 2024-04-18 RX ORDER — OLMESARTAN MEDOXOMIL 5 MG/1
1 TABLET, FILM COATED ORAL
Refills: 0 | DISCHARGE

## 2024-04-18 NOTE — ASU DISCHARGE PLAN (ADULT/PEDIATRIC) - PAIN MANAGEMENT
You were given 1000mg IV Tylenol for pain management.  Please DO NOT take any Tylenol containing products, such as  Vicodin, Percocet, Excedrin, many cold preparations for the next 6 hours (until  3PM). DO NOT EXCEED 3000MG OF TYLENOL OVER 24 HOURS.

## 2024-04-18 NOTE — ASU DISCHARGE PLAN (ADULT/PEDIATRIC) - FOLLOW UP APPOINTMENTS
[facility] may also call Recovery Room (PACU) 24/7 @ [phone] or 911/[facility]

## 2024-04-18 NOTE — ASU DISCHARGE PLAN (ADULT/PEDIATRIC) - ASU DC SPECIAL INSTRUCTIONSFT
Initial followup with plastic surgery in the next 5-15 days, regarding matters of bandages, activities, and showering.    Dr. Hope should call with pathology report in approximately 2 weeks.  The conversation will determine further management.

## 2024-04-19 PROBLEM — I10 ESSENTIAL (PRIMARY) HYPERTENSION: Chronic | Status: ACTIVE | Noted: 2024-04-12

## 2024-04-19 PROBLEM — N20.0 CALCULUS OF KIDNEY: Chronic | Status: ACTIVE | Noted: 2024-04-12

## 2024-04-19 PROBLEM — M19.90 UNSPECIFIED OSTEOARTHRITIS, UNSPECIFIED SITE: Chronic | Status: ACTIVE | Noted: 2024-04-12

## 2024-04-19 PROBLEM — Z85.828 PERSONAL HISTORY OF OTHER MALIGNANT NEOPLASM OF SKIN: Chronic | Status: ACTIVE | Noted: 2024-04-12

## 2024-04-30 PROBLEM — E78.5 HYPERLIPIDEMIA, UNSPECIFIED: Chronic | Status: ACTIVE | Noted: 2024-04-12

## 2024-05-01 ENCOUNTER — APPOINTMENT (OUTPATIENT)
Dept: PLASTIC SURGERY | Facility: CLINIC | Age: 84
End: 2024-05-01
Payer: MEDICARE

## 2024-05-01 VITALS
WEIGHT: 138 LBS | HEART RATE: 84 BPM | TEMPERATURE: 98.1 F | OXYGEN SATURATION: 98 % | BODY MASS INDEX: 25.4 KG/M2 | HEIGHT: 62 IN | SYSTOLIC BLOOD PRESSURE: 118 MMHG | DIASTOLIC BLOOD PRESSURE: 72 MMHG

## 2024-05-01 DIAGNOSIS — C46.9 KAPOSI'S SARCOMA, UNSPECIFIED: ICD-10-CM

## 2024-05-01 LAB — SURGICAL PATHOLOGY STUDY: SIGNIFICANT CHANGE UP

## 2024-05-01 PROCEDURE — 99024 POSTOP FOLLOW-UP VISIT: CPT

## 2024-05-01 NOTE — PHYSICAL EXAM
[de-identified] : Left foot bolster removed, skin graft in place with good adherence, wound clean, no drainage/bleeding noted, no sign of gross infection, dressed with bacitracin and xeroform [de-identified] : left lower abdominal incision clean

## 2024-05-01 NOTE — HISTORY OF PRESENT ILLNESS
[FreeTextEntry1] :  Pt is a 83 year female s/p complete closure left foot with skin graft. DOS 4/18/24. Patient accompanied by family member. Pt doing well. No further complaints at this time. Denies fever, chills, pain, rash.

## 2024-05-02 NOTE — REVIEW OF SYSTEMS
[Negative] : Musculoskeletal [FreeTextEntry5] : Hypertension [FreeTextEntry7] : NKDA [FreeTextEntry9] : Nephrolithiasis [de-identified] : Kapwade's [de-identified] : Diabetes [FreeTextEntry1] : Anemia

## 2024-05-02 NOTE — HISTORY OF PRESENT ILLNESS
[de-identified] : 83-year-old lady.  Referred by radiation oncology: Dr. Loretta BASILOI.  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  Not a candidate for topical treatment, or primary radiation therapy. Presents to discuss resection with the anticipation of adjuvant radiation therapy.  Biopsy was performed by dermatology: Dr. Rose HECTOR.   The patient has been aware of the growth since 2024. It is gradually grown, and has become tender, to even now painful at rest.  No preceding trauma or strain.  No other signs or symptoms.   Not immunocompromise.  No personal history of malignancy.   No relatives with a history of cancer.   PMD: Dr. Lucinda MORALES.  NKDA.  No pacemaker or defibrillator. No anticoagulants.  81 mg aspirin daily.  + DIABETES. She has stopped (on her own accord) her medications (she was prescribed Janumet).  + Hypertension. Treated with Benicar (Olmesartan).  + Hypercholesterolemia. Treated with rosuvastatin (Crestor).  + Anemia. Hematology: Dr. Fay PARKS. Cause attributed to chronic kidney disease. She receives periodic/intermittent iron infusions intravenously.  + History of nephrolithiasis. Also has chronic kidney disease. Neurology: Dr. Haseeb DOMINGUEZ.  + Osteoarthritis. Takes daily [employer].  History of cataract surgery (2022).   Menarche at 15.  5, para 4. Natural menopause at 49.  She has not seen a gynecologist since at least .  Last mammogram was ~.  She has never had a colonoscopy.

## 2024-05-02 NOTE — REASON FOR VISIT
[Initial Consultation] : an initial consultation for [Other: _____] : [unfilled] [FreeTextEntry2] : Nodular Kaposi's sarcoma of the bottom of the left foot, not amenable to topical treatment, or primary radiation therapy, referred for consideration of surgery.

## 2024-05-02 NOTE — ASSESSMENT
[FreeTextEntry1] : 83-year-old lady with a history of diabetes, anemia, hypertension, hypercholesterolemia, osteoarthritis, nephrolithiasis, and cataracts.  She has a nodular Kaposi's sarcoma on the bottom of her left foot.  She is not a candidate for topical treatment, or primary radiation therapy.  She has been referred for consideration of resection, which would be coordinated with plastic surgery for reconstruction.   Diagnosis reviewed.  For her preoperative assessment I recommended: Chest x-ray. Left groin ultrasound. Both prescriptions entered/provided.  I have asked him to call me a week after the imaging to discuss the results.   If there are no worrisome findings on her preoperative imaging, I have offered excision as an outpatient procedure, coordinated with plastic surgery for reconstruction, with the anticipation of adjuvant radiation therapy.  Reviewed in detail, all questions answered.  They understand and would like to proceed with surgery, paperwork submitted.  Referring physician contacted through secure email.   04/0/24: Left foot sonogram through our system: 1.  Left foot: Plantar. 1.3 x 0.7 x 1.1 cm hypoechoic nodule. 2.  Left groin: No adenopathy.  Accompanying chest x-ray was normal.   04/12/2024: I returned a call from her granddaughter (Tiffanie: [phone]). I reviewed the above favorable imaging results.  Surgery is tentatively scheduled for April 18.   5/3/2024: I called her daughter-in-law (Tiffanie: [phone]). I had to leave a voicemail. Pathology from the April 18, 2024, operation demonstrates a 1.5 cm nodular Kaposi's sarcoma. Margins of resection are negative. Plastics: Dr. Artemio Rdz. Referring physician contacted through secure email.

## 2024-05-02 NOTE — PHYSICAL EXAM
[Normal] : supple, no neck mass and thyroid not enlarged [Normal Neck Lymph Nodes] : normal neck lymph nodes  [Normal Supraclavicular Lymph Nodes] : normal supraclavicular lymph nodes [Normal Groin Lymph Nodes] : normal groin lymph nodes [Normal Axillary Lymph Nodes] : normal axillary lymph nodes [Normal] : full range of motion and no deformities appreciated [de-identified] : See diagram

## 2024-05-14 ENCOUNTER — APPOINTMENT (OUTPATIENT)
Dept: RADIATION ONCOLOGY | Facility: CLINIC | Age: 84
End: 2024-05-14

## 2024-05-15 ENCOUNTER — APPOINTMENT (OUTPATIENT)
Dept: PLASTIC SURGERY | Facility: CLINIC | Age: 84
End: 2024-05-15
Payer: MEDICARE

## 2024-05-15 VITALS — BODY MASS INDEX: 25.4 KG/M2 | TEMPERATURE: 98 F | WEIGHT: 138 LBS | HEIGHT: 62 IN

## 2024-05-15 PROCEDURE — 99024 POSTOP FOLLOW-UP VISIT: CPT

## 2024-05-18 NOTE — PHYSICAL EXAM
[de-identified] : Left foot bolster removed, skin graft in place with good adherence, wound clean, no drainage/bleeding noted, no sign of gross infection, dressed with bacitracin and xeroform. graft with darker color than last time and epidermolysis in center. some pink around the perimeter [de-identified] : left lower abdominal incision clean

## 2024-06-05 ENCOUNTER — APPOINTMENT (OUTPATIENT)
Dept: PLASTIC SURGERY | Facility: CLINIC | Age: 84
End: 2024-06-05

## 2024-06-05 VITALS
DIASTOLIC BLOOD PRESSURE: 72 MMHG | HEIGHT: 62 IN | HEART RATE: 68 BPM | OXYGEN SATURATION: 96 % | WEIGHT: 138 LBS | TEMPERATURE: 98 F | SYSTOLIC BLOOD PRESSURE: 109 MMHG | BODY MASS INDEX: 25.4 KG/M2

## 2024-06-05 PROCEDURE — 11042 DBRDMT SUBQ TIS 1ST 20SQCM/<: CPT | Mod: 58

## 2024-06-05 PROCEDURE — 99024 POSTOP FOLLOW-UP VISIT: CPT

## 2024-06-12 NOTE — PROCEDURE
[FreeTextEntry6] : some sharp excisionald debridement of skin and subq performed at bedside to remove loose eschar and graft. granulation tissue cauterized with silver nitrate.

## 2024-06-12 NOTE — PHYSICAL EXAM
[de-identified] : skin graft in place with good adherence, wound clean, no drainage/bleeding noted, no sign of gross infection, dressed with bacitracin and xeroform. graft with darker color than last time and epidermolysis in center. some pink around the perimeter [de-identified] : left lower abdominal incision clean

## 2024-08-07 ENCOUNTER — APPOINTMENT (OUTPATIENT)
Dept: PLASTIC SURGERY | Facility: CLINIC | Age: 84
End: 2024-08-07

## 2024-08-07 PROCEDURE — 99214 OFFICE O/P EST MOD 30 MIN: CPT

## 2024-08-09 NOTE — PHYSICAL EXAM
[de-identified] : skin graft in place with good adherence, wound clean, no drainage/bleeding noted, no sign of gross infection, well healed. some superficial splough but not open. Pt walks without difficulty. [de-identified] : left lower abdominal incision clean

## 2024-08-09 NOTE — HISTORY OF PRESENT ILLNESS
[FreeTextEntry1] :  Pt is a 83 year female s/p complete closure left foot with skin graft. DOS 4/18/24. Patient accompanied by family member. Pt doing well. No further complaints at this time. Denies fever, chills, pain, rash. Has been walking without issues

## 2024-08-09 NOTE — PHYSICAL EXAM
[de-identified] : skin graft in place with good adherence, wound clean, no drainage/bleeding noted, no sign of gross infection, well healed. some superficial splough but not open. Pt walks without difficulty. [de-identified] : left lower abdominal incision clean

## (undated) DEVICE — SOL IRR POUR NS 0.9% 1500ML

## (undated) DEVICE — DRAPE 3/4 SHEET 52X76"

## (undated) DEVICE — ELCTR BOVIE PENCIL SMOKE EVACUATION

## (undated) DEVICE — DRSG DERMABOND MINI

## (undated) DEVICE — FOLEY TRAY 16FR 5CC LTX UMETER CLOSED

## (undated) DEVICE — DRSG ADAPTIC 3 X 8"

## (undated) DEVICE — SUT CHROMIC 4-0 27" SH

## (undated) DEVICE — MARKING PEN W RULER

## (undated) DEVICE — DRAPE 1/2 SHEET 40X57"

## (undated) DEVICE — SOL IRR POUR H2O 250ML

## (undated) DEVICE — WARMING BLANKET LOWER ADULT

## (undated) DEVICE — LIGASURE SMALL JAW

## (undated) DEVICE — SOL IRR POUR H2O 1500ML

## (undated) DEVICE — DRAPE MAYO STAND 30"

## (undated) DEVICE — DRAPE SPLIT SHEET 77" X 108"

## (undated) DEVICE — STAPLER SKIN VISI-STAT 35 WIDE

## (undated) DEVICE — LABELS BLANK W PEN

## (undated) DEVICE — SUT MONOCRYL 3-0 27" PS-2 UNDYED

## (undated) DEVICE — SPECIMEN CONTAINER 100ML

## (undated) DEVICE — ELCTR GROUNDING PAD ADULT COVIDIEN

## (undated) DEVICE — BLADE SCALPEL SAFETYLOCK #15

## (undated) DEVICE — GOWN TRIMAX LG

## (undated) DEVICE — POSITIONER STRAP ARMBOARD VELCRO TS-30

## (undated) DEVICE — DRSG TEGADERM 6"X8"

## (undated) DEVICE — SUT SILK 2-0 18" SH (POP-OFF)

## (undated) DEVICE — PREP BETADINE 5% STERILE OPTHALMIC SOLUTION

## (undated) DEVICE — DRAPE TOWEL BLUE 17" X 24"

## (undated) DEVICE — DRAIN BLAKE 15FR BARD CHANNEL

## (undated) DEVICE — DRAPE INSTRUMENT POUCH 6.75" X 11"

## (undated) DEVICE — ELCTR 4-DISC 20MM 49" (RED, BLUE, GREEN, BLACK)

## (undated) DEVICE — SOL IRR POUR NS 0.9% 500ML

## (undated) DEVICE — GAMMA SLEEVE DISPOSABLE

## (undated) DEVICE — DRSG STOCKINETTE IMPERVIOUS MED

## (undated) DEVICE — DRSG STOCKINETTE IMPERVIOUS XL

## (undated) DEVICE — PREP CHLORAPREP HI-LITE ORANGE 26ML

## (undated) DEVICE — DRAIN RESERVOIR FOR JACKSON PRATT 100CC CARDINAL

## (undated) DEVICE — POSITIONER FOAM EGG CRATE ULNAR 2PCS (PINK)

## (undated) DEVICE — DRSG KLING 4"

## (undated) DEVICE — WARMING BLANKET UPPER ADULT

## (undated) DEVICE — ELCTR BOVIE TIP BLADE INSULATED 2.75" EDGE

## (undated) DEVICE — MEDICATION LABELS W MARKER

## (undated) DEVICE — WARMING BLANKET FULL UNDERBODY

## (undated) DEVICE — LIJ-ZIMMER MESHGRAFTER: Type: DURABLE MEDICAL EQUIPMENT

## (undated) DEVICE — VENODYNE/SCD SLEEVE CALF MEDIUM

## (undated) DEVICE — DRSG XEROFORM 5 X 9"

## (undated) DEVICE — DRSG OPSITE 13.75 X 4"

## (undated) DEVICE — DRSG TEGADERM 1.75X1.75"

## (undated) DEVICE — BLADE SCALPEL SAFETYLOCK #10

## (undated) DEVICE — DRSG VAC WHITEFOAM LARGE (WHITE)

## (undated) DEVICE — PACK MAJOR ABDOMINAL WITH LAP

## (undated) DEVICE — DRSG COMBINE 5X9"

## (undated) DEVICE — DRSG COBAN 4"

## (undated) DEVICE — SUT CHROMIC 4-0 27" RB-1

## (undated) DEVICE — DRSG CURITY GAUZE SPONGE 4 X 4" 12-PLY

## (undated) DEVICE — SPEAR SURG EYE WECK-CELL CELOS

## (undated) DEVICE — GLV 7 PROTEXIS (WHITE)

## (undated) DEVICE — SUT SURGIPRO II 4-0 18" P-12

## (undated) DEVICE — DRAIN JACKSON PRATT 10MM FLAT FULL NO TROCAR

## (undated) DEVICE — CANISTER DISPOSABLE THIN WALL 3000CC

## (undated) DEVICE — GLV 8 PROTEXIS (WHITE)

## (undated) DEVICE — DRSG KERLIX ROLL 4.5"

## (undated) DEVICE — SUT POLYSORB 3-0 30" V-20 UNDYED

## (undated) DEVICE — DRSG STERISTRIPS 0.5 X 4"